# Patient Record
Sex: MALE | Race: ASIAN | NOT HISPANIC OR LATINO | URBAN - METROPOLITAN AREA
[De-identification: names, ages, dates, MRNs, and addresses within clinical notes are randomized per-mention and may not be internally consistent; named-entity substitution may affect disease eponyms.]

---

## 2022-11-29 ENCOUNTER — OFFICE (OUTPATIENT)
Dept: URBAN - METROPOLITAN AREA CLINIC 14 | Facility: CLINIC | Age: 32
End: 2022-11-29

## 2022-11-29 VITALS
DIASTOLIC BLOOD PRESSURE: 62 MMHG | HEIGHT: 68 IN | WEIGHT: 189.6 LBS | HEART RATE: 80 BPM | OXYGEN SATURATION: 95 % | SYSTOLIC BLOOD PRESSURE: 113 MMHG

## 2022-11-29 DIAGNOSIS — R19.7 DIARRHEA, UNSPECIFIED: ICD-10-CM

## 2022-11-29 DIAGNOSIS — K21.9 GASTRO-ESOPHAGEAL REFLUX DISEASE WITHOUT ESOPHAGITIS: ICD-10-CM

## 2022-11-29 DIAGNOSIS — R10.13 EPIGASTRIC PAIN: ICD-10-CM

## 2022-11-29 DIAGNOSIS — R14.0 ABDOMINAL DISTENSION (GASEOUS): ICD-10-CM

## 2022-11-29 PROCEDURE — 99204 OFFICE O/P NEW MOD 45 MIN: CPT

## 2022-11-29 RX ORDER — PANTOPRAZOLE SODIUM 40 MG/1
TABLET, DELAYED RELEASE ORAL
Qty: 30 | Refills: 2 | Status: ACTIVE
Start: 2022-11-29

## 2022-11-29 RX ORDER — SIMETHICONE 180 MG
CAPSULE ORAL
Qty: 60 | Refills: 1 | Status: COMPLETED
Start: 2022-11-29 | End: 2023-02-28

## 2022-11-29 NOTE — SERVICEHPINOTES
Mr. Coto is a 32-year-old male here with complaints of worsening reflux, epigastric pain, intermittent diarrhea, and bloating. Patient reports that he has had worsening symptoms over 3-4 months. He states that he has GERD almost daily and has a metallic taste in the back of his throat. He also reports that he will have intermittent diarrhea with urgency. He he cannot correlate any food or activity associations when this happens but does state it is usually postprandially. He also reports he will have episodes of bloating and borborgmi. He states this is usually relieved with a bowel movement. He denies any family history of colorectal cancer or colon polyps. He denies any melena, hematochezia, or unintentional weight loss.

## 2022-12-02 LAB
C-REACTIVE PROTEIN, QUANT: 2 MG/L (ref 0–10)
CELIAC DISEASE COMPREHENSIVE: DEAMIDATED GLIADIN ABS, IGA: 3 UNITS (ref 0–19)
CELIAC DISEASE COMPREHENSIVE: DEAMIDATED GLIADIN ABS, IGG: 2 UNITS (ref 0–19)
CELIAC DISEASE COMPREHENSIVE: ENDOMYSIAL ANTIBODY IGA: NEGATIVE
CELIAC DISEASE COMPREHENSIVE: IMMUNOGLOBULIN A, QN, SERUM: 157 MG/DL (ref 90–386)
CELIAC DISEASE COMPREHENSIVE: T-TRANSGLUTAMINASE (TTG) IGA: <2 U/ML
CELIAC DISEASE COMPREHENSIVE: T-TRANSGLUTAMINASE (TTG) IGG: 5 U/ML (ref 0–5)
HELICOBACTER PYLORI, IGM AB: H PYLORI, IGM ABS: <9 UNITS
IGE FOOD PROF W/COMPONENT RFLX: CLASS DESCRIPTION: (no result)
IGE FOOD PROF W/COMPONENT RFLX: F001-IGE EGG WHITE: <0.1 KU/L
IGE FOOD PROF W/COMPONENT RFLX: F002-IGE MILK: <0.1 KU/L
IGE FOOD PROF W/COMPONENT RFLX: F003-IGE CODFISH: <0.1 KU/L
IGE FOOD PROF W/COMPONENT RFLX: F004-IGE WHEAT: <0.1 KU/L
IGE FOOD PROF W/COMPONENT RFLX: F008-IGE CORN: <0.1 KU/L
IGE FOOD PROF W/COMPONENT RFLX: F010-IGE SESAME SEED: <0.1 KU/L
IGE FOOD PROF W/COMPONENT RFLX: F013-IGE PEANUT: <0.1 KU/L
IGE FOOD PROF W/COMPONENT RFLX: F014-IGE SOYBEAN: <0.1 KU/L
IGE FOOD PROF W/COMPONENT RFLX: F024-IGE SHRIMP: <0.1 KU/L
IGE FOOD PROF W/COMPONENT RFLX: F207-IGE CLAM: <0.1 KU/L
IGE FOOD PROF W/COMPONENT RFLX: F256-IGE WALNUT: <0.1 KU/L
IGE FOOD PROF W/COMPONENT RFLX: F338-IGE SCALLOP: <0.1 KU/L

## 2023-02-28 ENCOUNTER — OFFICE (OUTPATIENT)
Dept: URBAN - METROPOLITAN AREA CLINIC 14 | Facility: CLINIC | Age: 33
End: 2023-02-28

## 2023-02-28 VITALS
DIASTOLIC BLOOD PRESSURE: 73 MMHG | SYSTOLIC BLOOD PRESSURE: 112 MMHG | WEIGHT: 183 LBS | HEART RATE: 77 BPM | HEIGHT: 68 IN | OXYGEN SATURATION: 100 %

## 2023-02-28 DIAGNOSIS — R14.0 ABDOMINAL DISTENSION (GASEOUS): ICD-10-CM

## 2023-02-28 DIAGNOSIS — K21.9 GASTRO-ESOPHAGEAL REFLUX DISEASE WITHOUT ESOPHAGITIS: ICD-10-CM

## 2023-02-28 PROCEDURE — 99212 OFFICE O/P EST SF 10 MIN: CPT

## 2023-02-28 RX ORDER — FAMOTIDINE 20 MG/1
TABLET, FILM COATED ORAL
Qty: 60 | Refills: 6 | Status: ACTIVE
Start: 2023-02-28